# Patient Record
Sex: MALE | Race: WHITE | Employment: FULL TIME | URBAN - NONMETROPOLITAN AREA
[De-identification: names, ages, dates, MRNs, and addresses within clinical notes are randomized per-mention and may not be internally consistent; named-entity substitution may affect disease eponyms.]

---

## 2023-02-18 ENCOUNTER — HOSPITAL ENCOUNTER (EMERGENCY)
Age: 53
Discharge: HOME | End: 2023-02-18
Payer: COMMERCIAL

## 2023-02-18 VITALS
DIASTOLIC BLOOD PRESSURE: 88 MMHG | OXYGEN SATURATION: 99 % | RESPIRATION RATE: 18 BRPM | WEIGHT: 190 LBS | HEART RATE: 88 BPM | SYSTOLIC BLOOD PRESSURE: 133 MMHG

## 2023-02-18 DIAGNOSIS — K52.9 GASTROENTERITIS: Primary | ICD-10-CM

## 2023-02-18 PROCEDURE — 99203 OFFICE O/P NEW LOW 30 MIN: CPT | Performed by: NURSE PRACTITIONER

## 2023-02-18 PROCEDURE — 99202 OFFICE O/P NEW SF 15 MIN: CPT

## 2023-02-18 RX ORDER — ONDANSETRON 4 MG/1
4 TABLET, ORALLY DISINTEGRATING ORAL 3 TIMES DAILY PRN
Qty: 20 TABLET | Refills: 0 | Status: SHIPPED | OUTPATIENT
Start: 2023-02-18

## 2023-02-18 ASSESSMENT — ENCOUNTER SYMPTOMS
SINUS PRESSURE: 0
SHORTNESS OF BREATH: 0
EYE PAIN: 0
ABDOMINAL PAIN: 0
BLOOD IN STOOL: 0
COUGH: 0
RHINORRHEA: 0
DIARRHEA: 1
NAUSEA: 1
CONSTIPATION: 0
VOMITING: 1
WHEEZING: 0

## 2023-02-18 ASSESSMENT — PAIN - FUNCTIONAL ASSESSMENT
PAIN_FUNCTIONAL_ASSESSMENT: ACTIVITIES ARE NOT PREVENTED
PAIN_FUNCTIONAL_ASSESSMENT: 0-10

## 2023-02-18 ASSESSMENT — PAIN DESCRIPTION - FREQUENCY: FREQUENCY: CONTINUOUS

## 2023-02-18 ASSESSMENT — PAIN DESCRIPTION - DESCRIPTORS: DESCRIPTORS: SHARP

## 2023-02-18 ASSESSMENT — PAIN SCALES - GENERAL: PAINLEVEL_OUTOF10: 8

## 2023-02-18 ASSESSMENT — PAIN DESCRIPTION - LOCATION: LOCATION: ABDOMEN

## 2023-02-18 ASSESSMENT — PAIN DESCRIPTION - ORIENTATION: ORIENTATION: LEFT;LOWER

## 2023-02-18 ASSESSMENT — PAIN DESCRIPTION - PAIN TYPE: TYPE: ACUTE PAIN

## 2023-02-18 NOTE — Clinical Note
Daisy Concepcion was seen and treated in our emergency department on 2/18/2023. He may return to work on 02/20/2023. If you have any questions or concerns, please don't hesitate to call.       Mary Beth Thomas, CASANDRA - CNP

## 2023-02-18 NOTE — ED PROVIDER NOTES
[id number]       Chief Complaint   Patient presents with    Nausea & Vomiting    Diarrhea        Nurses Notes reviewed and I agree except as noted in the HPI. HISTORY OF PRESENT ILLNESS   Carol Pro is a 46 y.o. male who presents to urgent care with complaint of nausea, vomiting and diarrhea been ongoing for 3 days. He also states that he has some left lower quadrant abdominal pain. Patient denies other symptoms including chest pain, shortness of breath or fever. Patient states he tried to take Pepto-Bismol, but was unable to keep it down. REVIEW OF SYSTEMS     Review of Systems   Constitutional:  Negative for appetite change, chills, fatigue, fever and unexpected weight change. HENT:  Negative for ear pain, rhinorrhea and sinus pressure. Eyes:  Negative for pain and visual disturbance. Respiratory:  Negative for cough, shortness of breath and wheezing. Cardiovascular:  Negative for chest pain, palpitations and leg swelling. Gastrointestinal:  Positive for diarrhea, nausea and vomiting. Negative for abdominal pain, blood in stool and constipation. Genitourinary:  Negative for dysuria, frequency and hematuria. Musculoskeletal:  Negative for arthralgias and joint swelling. Skin:  Negative for rash. Neurological:  Negative for dizziness, syncope, weakness, light-headedness and headaches. Hematological:  Does not bruise/bleed easily. PAST MEDICAL HISTORY   History reviewed. No pertinent past medical history. SURGICAL HISTORY     Patient  has no past surgical history on file. CURRENT MEDICATIONS       Previous Medications    No medications on file       ALLERGIES     Patient is has No Known Allergies. FAMILY HISTORY     Patient'sfamily history is not on file. SOCIAL HISTORY     Patient  reports that he has never smoked.  He has never used smokeless tobacco. He reports that he does not drink alcohol and does not use drugs. PHYSICAL EXAM     ED TRIAGE VITALS  BP: 133/88,  , Heart Rate: 88, Resp: 18, SpO2: 99 %  Physical Exam  Vitals and nursing note reviewed. Constitutional:       General: He is not in acute distress. Appearance: He is well-developed. He is not ill-appearing, toxic-appearing or diaphoretic. HENT:      Head: Normocephalic and atraumatic. Eyes:      Conjunctiva/sclera: Conjunctivae normal.   Cardiovascular:      Rate and Rhythm: Normal rate and regular rhythm. Heart sounds: Normal heart sounds. No murmur heard. No gallop. Pulmonary:      Effort: Pulmonary effort is normal. No respiratory distress. Breath sounds: Normal breath sounds. No decreased breath sounds, wheezing, rhonchi or rales. Abdominal:      General: Bowel sounds are normal. There is no distension. Palpations: Abdomen is soft. There is no mass. Tenderness: There is abdominal tenderness in the left lower quadrant. There is no right CVA tenderness, left CVA tenderness, guarding or rebound. Hernia: No hernia is present. Musculoskeletal:         General: Normal range of motion. Cervical back: Normal range of motion and neck supple. Skin:     General: Skin is warm and dry. Neurological:      Mental Status: He is alert and oriented to person, place, and time. DIAGNOSTIC RESULTS   Labs:No results found for this visit on 02/18/23. IMAGING:  No orders to display     URGENT CARE COURSE:        MDM      Patient presents to urgent care with complaint of nausea, vomiting and diarrhea. Patient does have left lower quadrant tenderness, without guarding or rebound. Patient's mucous membranes are moist.  His vital signs are stable. We will treat with oral Zofran. He was instructed to go to the ER if he is unable to drink liquids. He is also instructed to go to the ER if his abdominal pain worsens or does not improve in the next 12 to 24 hours.     Patient instructed to go to ER for worsening symptoms, inability to keep liquids down, inability to urinate for greater than 8 hours or difficulty breathing. Follow-up with your primary care provider. Continue clear liquids for today and then may try bland diet. Medications - No data to display  PROCEDURES:    Procedures    FINALIMPRESSION      1.  Gastroenteritis        DISPOSITION/PLAN   DISPOSITION Decision To Discharge 02/18/2023 01:17:54 PM    PATIENT REFERRED TO:  Yasmin Oden MD  [id number] [facility]  [phone]    In 2 days    DISCHARGE MEDICATIONS:  New Prescriptions    ONDANSETRON (ZOFRAN-ODT) 4 MG DISINTEGRATING TABLET    Take 1 tablet by mouth 3 times daily as needed for Nausea or Vomiting     Current Discharge Medication List          CASANDRA Mera - CASANDRA Frazier CNP  02/18/23 2997

## 2023-02-18 NOTE — Clinical Note
Linda Beard was seen and treated in our emergency department on 2/18/2023. He may return to work on 02/19/2023. If you have any questions or concerns, please don't hesitate to call.       Kane Brumfield, APRN - CNP

## 2023-02-18 NOTE — ED TRIAGE NOTES
Patient to room with c/o nausea, vomiting, diarrhea, and left, lower abdominal pain beginning three days ago. States unable to tolerate food or liquids.